# Patient Record
Sex: MALE | URBAN - METROPOLITAN AREA
[De-identification: names, ages, dates, MRNs, and addresses within clinical notes are randomized per-mention and may not be internally consistent; named-entity substitution may affect disease eponyms.]

---

## 2024-06-19 ENCOUNTER — TELEPHONE (OUTPATIENT)
Dept: PRIMARY CARE | Facility: CLINIC | Age: 59
End: 2024-06-19
Payer: COMMERCIAL

## 2024-06-19 NOTE — TELEPHONE ENCOUNTER
New patient ion 7/18 is son of your patient Raiza Calderon.     He is asking for blood work and PSA orders before his appointment so you can go over them    Avinash [phone]

## 2024-07-18 ENCOUNTER — APPOINTMENT (OUTPATIENT)
Dept: PRIMARY CARE | Facility: CLINIC | Age: 59
End: 2024-07-18
Payer: COMMERCIAL

## 2024-07-18 VITALS
OXYGEN SATURATION: 97 % | WEIGHT: 162.4 LBS | BODY MASS INDEX: 25.49 KG/M2 | DIASTOLIC BLOOD PRESSURE: 82 MMHG | HEART RATE: 73 BPM | SYSTOLIC BLOOD PRESSURE: 140 MMHG | HEIGHT: 67 IN

## 2024-07-18 DIAGNOSIS — Z12.11 SCREEN FOR COLON CANCER: ICD-10-CM

## 2024-07-18 DIAGNOSIS — R03.0 ELEVATED BLOOD PRESSURE READING IN OFFICE WITHOUT DIAGNOSIS OF HYPERTENSION: ICD-10-CM

## 2024-07-18 DIAGNOSIS — Z00.00 ANNUAL PHYSICAL EXAM: Primary | ICD-10-CM

## 2024-07-18 PROCEDURE — 99386 PREV VISIT NEW AGE 40-64: CPT | Performed by: INTERNAL MEDICINE

## 2024-07-18 PROCEDURE — 3008F BODY MASS INDEX DOCD: CPT | Performed by: INTERNAL MEDICINE

## 2024-07-18 PROCEDURE — 1036F TOBACCO NON-USER: CPT | Performed by: INTERNAL MEDICINE

## 2024-07-18 ASSESSMENT — ENCOUNTER SYMPTOMS
DYSURIA: 0
CONSTIPATION: 0
FEVER: 0
NAUSEA: 0
VOMITING: 0
SORE THROAT: 0
ARTHRALGIAS: 0
FREQUENCY: 0
DIARRHEA: 0
PALPITATIONS: 0
COUGH: 0
TROUBLE SWALLOWING: 0
DIZZINESS: 0
FATIGUE: 0
SHORTNESS OF BREATH: 0
LIGHT-HEADEDNESS: 0
ABDOMINAL PAIN: 0

## 2024-07-18 ASSESSMENT — PAIN SCALES - GENERAL: PAINLEVEL: 0-NO PAIN

## 2024-07-18 ASSESSMENT — PATIENT HEALTH QUESTIONNAIRE - PHQ9
1. LITTLE INTEREST OR PLEASURE IN DOING THINGS: NOT AT ALL
2. FEELING DOWN, DEPRESSED OR HOPELESS: NOT AT ALL
SUM OF ALL RESPONSES TO PHQ9 QUESTIONS 1 AND 2: 0

## 2024-07-18 NOTE — PROGRESS NOTES
"Subjective   Patient ID: Avinash Casarez is a 59 y.o. male who presents for new patient visit.    Patient is here to establish with new primary care physician.  He has no current complaints or issues that need to be addressed but does have forms to be completed for insurance purposes.        Review of Systems   Constitutional:  Negative for fatigue and fever.   HENT:  Negative for sore throat and trouble swallowing.    Eyes:  Negative for visual disturbance.   Respiratory:  Negative for cough and shortness of breath.    Cardiovascular:  Negative for chest pain, palpitations and leg swelling.   Gastrointestinal:  Negative for abdominal pain, constipation, diarrhea, nausea and vomiting.   Genitourinary:  Negative for dysuria and frequency.   Musculoskeletal:  Negative for arthralgias.   Skin:  Negative for rash.   Neurological:  Negative for dizziness and light-headedness.       Objective   Medication Documentation Review Audit       Reviewed by Ava Cary MD (Physician) on 07/18/24 at 0941      Medication Order Taking? Sig Documenting Provider Last Dose Status            No Medications to Display                                 No Known Allergies    /82   Pulse 73   Ht 1.689 m (5' 6.5\")   Wt 73.7 kg (162 lb 6.4 oz)   SpO2 97%   BMI 25.82 kg/m²     Physical Exam  Constitutional:       Appearance: Normal appearance.   HENT:      Head: Normocephalic and atraumatic.      Nose: Nose normal.   Eyes:      Extraocular Movements: Extraocular movements intact.      Pupils: Pupils are equal, round, and reactive to light.   Cardiovascular:      Rate and Rhythm: Normal rate and regular rhythm.   Pulmonary:      Breath sounds: Normal breath sounds.   Abdominal:      General: Abdomen is flat. Bowel sounds are normal.      Palpations: Abdomen is soft.   Musculoskeletal:      Right lower leg: No edema.      Left lower leg: No edema.   Neurological:      Mental Status: He is alert.           Assessment/Plan   Problem " List Items Addressed This Visit       Annual physical exam - Primary     Annual physical exam completed.  We will check baseline labs which will include CBC, CMP, lipids, PSA ,vitamin D level and thyroid    Patient will follow-up in 1 month for blood pressure check since it was elevated today    Patient is also 59 years old and has never had his colon looked at.  He declines a colonoscopy but since he is low risk we will check a Cologuard test.         Relevant Orders    Lipid Panel    CBC    Comprehensive Metabolic Panel    TSH with reflex to Free T4 if abnormal    Vitamin D 25-Hydroxy,Total (for eval of Vitamin D levels)    PSA    Elevated blood pressure reading in office without diagnosis of hypertension     Blood pressure reading was elevated twice in the office today although he has no elevated hypertension history.  At this time I have asked him to follow-up in 1 month to check his blood pressure again to see if this is an ongoing problem or just a coincidence and nervousness over meeting with  On the first time.            Other Visit Diagnoses       Screen for colon cancer        Relevant Orders    Cologuard® colon cancer screening                   It has been a pleasure seeing you.  Ava Cary MD

## 2024-07-18 NOTE — ASSESSMENT & PLAN NOTE
Blood pressure reading was elevated twice in the office today although he has no elevated hypertension history.  At this time I have asked him to follow-up in 1 month to check his blood pressure again to see if this is an ongoing problem or just a coincidence and nervousness over meeting with  On the first time.

## 2024-07-18 NOTE — ASSESSMENT & PLAN NOTE
Annual physical exam completed.  We will check baseline labs which will include CBC, CMP, lipids, PSA ,vitamin D level and thyroid    Patient will follow-up in 1 month for blood pressure check since it was elevated today    Patient is also 59 years old and has never had his colon looked at.  He declines a colonoscopy but since he is low risk we will check a Cologuard test.

## 2024-08-08 LAB — NONINV COLON CA DNA+OCC BLD SCRN STL QL: NEGATIVE
